# Patient Record
Sex: MALE | Race: WHITE | NOT HISPANIC OR LATINO
[De-identification: names, ages, dates, MRNs, and addresses within clinical notes are randomized per-mention and may not be internally consistent; named-entity substitution may affect disease eponyms.]

---

## 2017-09-11 ENCOUNTER — APPOINTMENT (OUTPATIENT)
Dept: ENDOCRINOLOGY | Facility: CLINIC | Age: 57
End: 2017-09-11

## 2018-09-10 ENCOUNTER — APPOINTMENT (OUTPATIENT)
Dept: OPHTHALMOLOGY | Facility: CLINIC | Age: 58
End: 2018-09-10
Payer: COMMERCIAL

## 2018-09-10 PROCEDURE — 92020 GONIOSCOPY: CPT

## 2018-09-10 PROCEDURE — 92012 INTRM OPH EXAM EST PATIENT: CPT

## 2018-09-10 PROCEDURE — 92083 EXTENDED VISUAL FIELD XM: CPT

## 2019-03-04 ENCOUNTER — APPOINTMENT (OUTPATIENT)
Dept: OPHTHALMOLOGY | Facility: CLINIC | Age: 59
End: 2019-03-04
Payer: COMMERCIAL

## 2019-03-04 DIAGNOSIS — H40.003 PREGLAUCOMA, UNSPECIFIED, BILATERAL: ICD-10-CM

## 2019-03-04 DIAGNOSIS — H40.053 OCULAR HYPERTENSION, BILATERAL: ICD-10-CM

## 2019-03-04 DIAGNOSIS — H40.033 ANATOMICAL NARROW ANGLE, BILATERAL: ICD-10-CM

## 2019-03-04 PROCEDURE — 92020 GONIOSCOPY: CPT

## 2019-03-04 PROCEDURE — 92250 FUNDUS PHOTOGRAPHY W/I&R: CPT

## 2019-03-04 PROCEDURE — 92014 COMPRE OPH EXAM EST PT 1/>: CPT

## 2019-10-08 ENCOUNTER — APPOINTMENT (OUTPATIENT)
Dept: ENDOCRINOLOGY | Facility: CLINIC | Age: 59
End: 2019-10-08
Payer: COMMERCIAL

## 2019-10-08 VITALS
HEART RATE: 66 BPM | SYSTOLIC BLOOD PRESSURE: 123 MMHG | WEIGHT: 146 LBS | DIASTOLIC BLOOD PRESSURE: 71 MMHG | BODY MASS INDEX: 24.32 KG/M2 | HEIGHT: 65 IN

## 2019-10-08 PROCEDURE — 99205 OFFICE O/P NEW HI 60 MIN: CPT

## 2019-10-10 NOTE — ASSESSMENT
[Importance of Diet and Exercise] : importance of diet and exercise to improve glycemic control, achieve weight loss and improve cardiovascular health [Self Monitoring of Blood Glucose] : self monitoring of blood glucose [Smoking Cessation] : smoking cessation [FreeTextEntry1] : 60 y/o M pt with:\par 1. 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 Pt has been and continues to be focused with his DM care. Pt's BS levels are in target, with occasional predictable episodes of hypoglycemias. He does not have any other signs or symptoms of any other endocrine autoimmune disorder. Discussed glucose monitor with pt, which he declined for now. Will order labs today.\par \par Return in 4 months.

## 2019-10-10 NOTE — END OF VISIT
[FreeTextEntry3] : All medical record entries made by the Scribe were at my, Dr. Niraj Greene, direction and personally dictated by me on 10/08/2019. I have reviewed the chart and agree that the record accurately reflects my personal performance of the history, physical exam, assessment and plan. I have also personally directed, reviewed and agreed with the chart.  [>50% of Time Spent on Counseling for ____] : Greater than 50% of the encounter time was spent on counseling for [unfilled] [Time Spent: ___ minutes] : I have spent [unfilled] minutes of face to face time with the patient

## 2019-10-10 NOTE — ADDENDUM
[FreeTextEntry1] : I, Louise Weinstein, acted solely as a scribe for Dr. Niraj Greene on this date. 10/08/2019.

## 2019-10-10 NOTE — PHYSICAL EXAM
[Alert] : alert [Normal Sclera/Conjunctiva] : normal sclera/conjunctiva [Normal Outer Ear/Nose] : the ears and nose were normal in appearance [No Thyroid Nodules] : there were no palpable thyroid nodules [No Respiratory Distress] : no respiratory distress [Clear to Auscultation] : lungs were clear to auscultation bilaterally [Normal Rate] : heart rate was normal  [Normal S1, S2] : normal S1 and S2 [Pedal Pulses Normal] : the pedal pulses are present [No Edema] : there was no peripheral edema [Normal Bowel Sounds] : normal bowel sounds [Spine Straight] : spine straight [Normal Gait] : normal gait [Right Foot Was Examined] : right foot ~C was examined [Left Foot Was Examined] : left foot ~C was examined [2+] : 2+ in the dorsalis pedis [Normal Reflexes] : deep tendon reflexes were 2+ and symmetric [Oriented x3] : oriented to person, place, and time [de-identified] : thyroid glands not palpated, no tenderness  [Vibration Dec.] : normal vibratory sensation at the level of the toes [FreeTextEntry1] : no calluses  [FreeTextEntry5] : no calluses  [de-identified] : no lipodystrophy

## 2019-10-10 NOTE — HISTORY OF PRESENT ILLNESS
[FreeTextEntry1] : 58 y/o male pt, /71, BMI 24.3, with Hx of T1DM (dx at age 24), self-referred, presents today to establish endocrine care with me.\par Other PMHx: R angiofibroma- benign tumor (dx in 2016 as per pt))\par FHx: CA (father) \par Denies FHx of DM \par SHx: active smoker, etOH use 7x a week; works as a musician \par Last funduscopic visit: in 9/2019; no DM retinopathy \par Last podiatrist visit: none recently\par Last PCP visit: none\par Last colonoscopy: in 2018\par Physically active\par \par Pt states he did not have radiation or any CA treatment for his angiofibroma. He notes he last saw the oncologist team x2 weeks ago at [facility] and performed an MRI.\par \par 10/8/19\par Today pt presents for DM f/u, feeling well with no major physical complaints. Pt states his last A1c was 5.9-6%, and he has low BS of "60-70" almost everyday. He does not know of any pattern related to this his low BS. Pt reports when his BS is higher than 180 at night, he would take an extra 1u on insulin. \par Pt states he did not have prostate exam. \par Denies recent hospitalizations and pins and needle sensation in lower extremities.\par \par Current Mediations: Lantus 22u, Humalog 4/6/7-8u ac meals

## 2019-10-21 ENCOUNTER — OTHER (OUTPATIENT)
Age: 59
End: 2019-10-21

## 2019-10-23 LAB
25(OH)D3 SERPL-MCNC: 33.5 NG/ML
ALBUMIN SERPL ELPH-MCNC: 4.5 G/DL
ALP BLD-CCNC: 40 U/L
ALT SERPL-CCNC: 13 U/L
ANION GAP SERPL CALC-SCNC: 11 MMOL/L
AST SERPL-CCNC: 15 U/L
BILIRUB SERPL-MCNC: 0.6 MG/DL
BUN SERPL-MCNC: 13 MG/DL
CALCIUM SERPL-MCNC: 9.7 MG/DL
CHLORIDE SERPL-SCNC: 103 MMOL/L
CHOLEST SERPL-MCNC: 173 MG/DL
CHOLEST/HDLC SERPL: 3 RATIO
CO2 SERPL-SCNC: 27 MMOL/L
CREAT SERPL-MCNC: 0.59 MG/DL
CREAT SPEC-SCNC: 171 MG/DL
ESTIMATED AVERAGE GLUCOSE: 120 MG/DL
GLUCOSE SERPL-MCNC: 80 MG/DL
HBA1C MFR BLD HPLC: 5.8 %
HDLC SERPL-MCNC: 58 MG/DL
LDLC SERPL CALC-MCNC: 101 MG/DL
MICROALBUMIN 24H UR DL<=1MG/L-MCNC: 1.6 MG/DL
MICROALBUMIN/CREAT 24H UR-RTO: 9 MG/G
POTASSIUM SERPL-SCNC: 4.1 MMOL/L
PROT SERPL-MCNC: 7 G/DL
SODIUM SERPL-SCNC: 141 MMOL/L
TRIGL SERPL-MCNC: 72 MG/DL
TSH SERPL-ACNC: 1.96 UIU/ML

## 2020-01-27 ENCOUNTER — APPOINTMENT (OUTPATIENT)
Dept: OPHTHALMOLOGY | Facility: CLINIC | Age: 60
End: 2020-01-27
Payer: COMMERCIAL

## 2020-01-27 ENCOUNTER — NON-APPOINTMENT (OUTPATIENT)
Age: 60
End: 2020-01-27

## 2020-01-27 PROCEDURE — 92014 COMPRE OPH EXAM EST PT 1/>: CPT

## 2020-01-27 PROCEDURE — 92020 GONIOSCOPY: CPT

## 2020-01-27 PROCEDURE — 92250 FUNDUS PHOTOGRAPHY W/I&R: CPT

## 2020-02-04 ENCOUNTER — APPOINTMENT (OUTPATIENT)
Dept: ENDOCRINOLOGY | Facility: CLINIC | Age: 60
End: 2020-02-04
Payer: COMMERCIAL

## 2020-02-04 VITALS
SYSTOLIC BLOOD PRESSURE: 131 MMHG | WEIGHT: 145 LBS | HEIGHT: 65 IN | BODY MASS INDEX: 24.16 KG/M2 | DIASTOLIC BLOOD PRESSURE: 70 MMHG | HEART RATE: 87 BPM

## 2020-02-04 LAB — GLUCOSE BLDC GLUCOMTR-MCNC: 97

## 2020-02-04 PROCEDURE — 99214 OFFICE O/P EST MOD 30 MIN: CPT | Mod: 25

## 2020-02-04 PROCEDURE — 82962 GLUCOSE BLOOD TEST: CPT

## 2020-02-05 NOTE — PHYSICAL EXAM
[Alert] : alert [Normal Sclera/Conjunctiva] : normal sclera/conjunctiva [Normal Outer Ear/Nose] : the ears and nose were normal in appearance [No Respiratory Distress] : no respiratory distress [No Thyroid Nodules] : there were no palpable thyroid nodules [Clear to Auscultation] : lungs were clear to auscultation bilaterally [Normal Rate] : heart rate was normal  [Normal S1, S2] : normal S1 and S2 [No Edema] : there was no peripheral edema [Pedal Pulses Normal] : the pedal pulses are present [Normal Gait] : normal gait [Normal Bowel Sounds] : normal bowel sounds [Spine Straight] : spine straight [Right Foot Was Examined] : right foot ~C was examined [Left Foot Was Examined] : left foot ~C was examined [Oriented x3] : oriented to person, place, and time [Normal Reflexes] : deep tendon reflexes were 2+ and symmetric [2+] : 2+ in the dorsalis pedis [Vibration Dec.] : normal vibratory sensation at the level of the toes [de-identified] : thyroid glands not palpated, no tenderness  [de-identified] : no lipodystrophy  [FreeTextEntry1] : no calluses  [FreeTextEntry5] : no calluses

## 2020-02-05 NOTE — HISTORY OF PRESENT ILLNESS
[FreeTextEntry1] : 10/8/19 A1c 5.8%, TSH 1.96, s. creat 0.59, urine no protein, LDL-c 101, Vit D 25-OH 33.5 \par \par 58 y/o M pt, /70, BMI 24.13, with Hx of T1DM (dx at age 24).\par Other PMHx: R angiofibroma- benign tumor (dx in 2016 as per pt)\par FHx: CA (father) \par Denies FHx of DM \par SHx: active smoker, etOH use 7x a week; works as a musician \par Last funduscopic visit: in 1/2020, no DM retinopathy \par Last podiatrist visit: none recently\par Last PCP visit: none\par Last colonoscopy: in 2018\par Physically active\par \par Pt states he did not have radiation or any CA treatment for his angiofibroma. \par \par 10/8/19\par Today pt presents for DM f/u, feeling well with no major physical complaints. Pt states his last A1c was 5.9-6%, and he has low BS of "60-70" almost everyday. He does not know of any pattern related to this his low BS. Pt reports when his BS is higher than 180 at night, he would take an extra 1u on insulin. \par Pt states he did not have prostate exam. \par Denies recent hospitalizations and pins and needle sensation in lower extremities.\par \par 2/4/20\par Today pt presents for DM f/u with POCT 97, feeling well with c/o "everyday I experience low BS and everyday I experience high BS." He notes he gets an MRI every 9 months for his angiofibroma. Pt states his last colonoscopy was 2 yrs ago.\par Testing glucose 6-10 times daily\par \par Current Mediations: Lantus 22u, Humalog 4/6/7-8u ac meals

## 2020-02-05 NOTE — ASSESSMENT
[Importance of Diet and Exercise] : importance of diet and exercise to improve glycemic control, achieve weight loss and improve cardiovascular health [Smoking Cessation] : smoking cessation [Self Monitoring of Blood Glucose] : self monitoring of blood glucose [Hypoglycemia Management] : hypoglycemia management [FreeTextEntry1] : 60 y/o M pt with:\par 1. Hx of T1DM (dx > 30 yrs ago), well controlled:\par Pt checks BS 10x QD and he experiences occasional episodes of hypoglycemia, however, he is knowledgeable and skillful in preventing and treating them. Pt's BP and LDL-c are in target. \par Recommend pt continue present DM management and refer pt to see urologist.\par \par Return in 6 months.

## 2020-02-05 NOTE — ADDENDUM
[FreeTextEntry1] : I, Louise Weinstein, acted solely as a scribe for Dr. Niraj Greene on this date. 02/04/2020.

## 2020-02-05 NOTE — END OF VISIT
[FreeTextEntry3] : All medical record entries made by the Scribe were at my, Dr. Niraj Greene, direction and personally dictated by me on 02/04/2020. I have reviewed the chart and agree that the record accurately reflects my personal performance of the history, physical exam, assessment and plan. I have also personally directed, reviewed and agreed with the chart.  [>50% of Time Spent on Counseling for ____] : Greater than 50% of the encounter time was spent on counseling for [unfilled] [Time Spent: ___ minutes] : I have spent [unfilled] minutes of face to face time with the patient

## 2020-02-05 NOTE — REVIEW OF SYSTEMS
[As Noted in HPI] : as noted in HPI [Negative] : Psychiatric [FreeTextEntry8] : denies difficulty voiding urine

## 2020-02-24 LAB
ALBUMIN SERPL ELPH-MCNC: 4.8 G/DL
ALP BLD-CCNC: 43 U/L
ALT SERPL-CCNC: 13 U/L
ANION GAP SERPL CALC-SCNC: 13 MMOL/L
AST SERPL-CCNC: 15 U/L
BILIRUB SERPL-MCNC: 0.6 MG/DL
BUN SERPL-MCNC: 19 MG/DL
CALCIUM SERPL-MCNC: 9.9 MG/DL
CHLORIDE SERPL-SCNC: 101 MMOL/L
CHOLEST SERPL-MCNC: 203 MG/DL
CHOLEST/HDLC SERPL: 3.4 RATIO
CO2 SERPL-SCNC: 27 MMOL/L
CREAT SERPL-MCNC: 0.66 MG/DL
CREAT SPEC-SCNC: 205 MG/DL
ESTIMATED AVERAGE GLUCOSE: 126 MG/DL
GLUCOSE SERPL-MCNC: 114 MG/DL
HBA1C MFR BLD HPLC: 6 %
HDLC SERPL-MCNC: 60 MG/DL
LDLC SERPL CALC-MCNC: 131 MG/DL
MICROALBUMIN 24H UR DL<=1MG/L-MCNC: 3.4 MG/DL
MICROALBUMIN/CREAT 24H UR-RTO: 16 MG/G
POTASSIUM SERPL-SCNC: 4.6 MMOL/L
PROT SERPL-MCNC: 7.1 G/DL
SODIUM SERPL-SCNC: 141 MMOL/L
TRIGL SERPL-MCNC: 60 MG/DL
TSH SERPL-ACNC: 0.86 UIU/ML

## 2020-09-08 ENCOUNTER — APPOINTMENT (OUTPATIENT)
Dept: ENDOCRINOLOGY | Facility: CLINIC | Age: 60
End: 2020-09-08

## 2020-10-12 ENCOUNTER — APPOINTMENT (OUTPATIENT)
Dept: OPHTHALMOLOGY | Facility: CLINIC | Age: 60
End: 2020-10-12

## 2020-12-18 ENCOUNTER — RX RENEWAL (OUTPATIENT)
Age: 60
End: 2020-12-18

## 2021-04-27 ENCOUNTER — TRANSCRIPTION ENCOUNTER (OUTPATIENT)
Age: 61
End: 2021-04-27

## 2021-04-27 ENCOUNTER — APPOINTMENT (OUTPATIENT)
Dept: ENDOCRINOLOGY | Facility: CLINIC | Age: 61
End: 2021-04-27
Payer: COMMERCIAL

## 2021-04-27 PROCEDURE — 99214 OFFICE O/P EST MOD 30 MIN: CPT | Mod: 95

## 2021-04-27 NOTE — REASON FOR VISIT
[Home] : at home, [unfilled] , at the time of the visit. [Medical Office: ([facility])___] : at the medical office located in  [Verbal consent obtained from patient] : the patient, [unfilled] [Follow - Up] : a follow-up visit [DM Type 1] : DM Type 1

## 2021-04-29 NOTE — END OF VISIT
[FreeTextEntry3] : All medical record entries made by the Scribe were at my, Dr. Niraj Greene, direction and personally dictated by me on 04/27/2021. I have reviewed the chart and agree that the record accurately reflects my personal performance of the history, physical exam, assessment and plan. I have also personally directed, reviewed and agreed with the chart.  [Time Spent: ___ minutes] : I have spent [unfilled] minutes of time on the encounter.

## 2021-04-29 NOTE — REVIEW OF SYSTEMS
[As Noted in HPI] : as noted in HPI [Negative] : Heme/Lymph [Joint Pain] : no joint pain [FreeTextEntry9] : Denies shoulder pain

## 2021-04-29 NOTE — ASSESSMENT
[FreeTextEntry1] : 61 y/o M pt with: \par \par 1. Hx of T1DM (dx > 30 yrs) \par No known DM related complications \par His A1c is remarkably around 6 (+ or - 0.3) \par Pt is very knowledgeable and skilled in managing his diabetes. When pt experiences episodes of hypoglycemia, there is always an explanation. \par Continue present MDI \par Pt had a remarkable calcium score CT scan a few yrs ago. \par His LDL-c is around 100: 101 in 10/2019, and 131 in 02/2020. \par Recommend Calcium Score CT Scan and discussion on statins. \par \par Return in 6-9 months. \par \par  [Carbohydrate Consistent Diet] : carbohydrate consistent diet [Importance of Diet and Exercise] : importance of diet and exercise to improve glycemic control, achieve weight loss and improve cardiovascular health [Exercise/Effect on Glucose] : exercise/effect on glucose [Hypoglycemia Management] : hypoglycemia management [Self Monitoring of Blood Glucose] : self monitoring of blood glucose

## 2021-04-29 NOTE — ADDENDUM
[FreeTextEntry1] : I, Balta Gutierrez, acted solely as a scribe for Dr. Niraj Greene on this date. 04/27/2021.

## 2021-04-29 NOTE — HISTORY OF PRESENT ILLNESS
[FreeTextEntry1] : 61 y/o M pt, with Hx of T1DM (dx at age 24 in 07/1984).\par Other PMHx: R angiofibroma- benign tumor (dx in 2016 as per pt)\par FHx: CA (father) \par Denies FHx of DM \par SHx: active smoker, etOH use 7x a week; works as a musician \par Last funduscopic visit: in 1/2020, no DM retinopathy \par Last podiatrist visit: none recently\par Last PCP visit: none\par Last colonoscopy: in 2018\par Physically active\par Pt did not take flu shot. Received first dose of COVID vaccine (Pfizer) \par \par Pt states he did not have radiation or any CA treatment for his angiofibroma. \par \par 10/8/19\par Today pt presents for DM f/u, feeling well with no major physical complaints. Pt states his last A1c was 5.9-6%, and he has low BS of "60-70" almost everyday. He does not know of any pattern related to this his low BS. Pt reports when his BS is higher than 180 at night, he would take an extra 1u on insulin. \par Pt states he did not have prostate exam. \par Denies recent hospitalizations and pins and needle sensation in lower extremities.\par \par 2/4/20\par Today pt presents for DM f/u with POCT 97, feeling well with c/o "everyday I experience low BS and everyday I experience high BS." He notes he gets an MRI every 9 months for his angiofibroma. Pt states his last colonoscopy was 2 yrs ago.\par Testing glucose 6-10 times daily\par \par 4/27/21\par Pt did not have any questions prior to the initiation of the telehealth visit. \par Today pt presents for DM f/gary feeling well with no major complaints. Pt experiences intermittent episodes of lower back pain but reports that "lately its been much better" due to change in mindset which reduces pain severity. Last time pt experienced back pain was a couple of months of ago. He denies joint pain, shoulder pain. \par Pt notes he has episodes of low BS every day, but the low BS does not make him feel weak or ill. He is consistent with Lantus dose and Humalog depending on what meals he will eat and his BS readings. Pt recently received a new meter, [employer] Next One. \par Pt notes he has lost 4 lbs. \par He has not seen any physician since 3/2020. \par  \par Current Medications: Lantus 22u, Humalog 4/6/7-8u ac meals \par \par Labs: \par 2/4/20: A1c 6.0%, s.creat 0.66, Ca 9.9, LDL-c 131, cholesterol 203, TSH 0.86, urine no protein \par 10/8/19 A1c 5.8%, TSH 1.96, s. creat 0.59, urine no protein, LDL-c 101, Vit D 25-OH 33.5

## 2021-05-03 ENCOUNTER — NON-APPOINTMENT (OUTPATIENT)
Age: 61
End: 2021-05-03

## 2021-05-04 ENCOUNTER — APPOINTMENT (OUTPATIENT)
Dept: OPHTHALMOLOGY | Facility: CLINIC | Age: 61
End: 2021-05-04
Payer: COMMERCIAL

## 2021-05-04 ENCOUNTER — NON-APPOINTMENT (OUTPATIENT)
Age: 61
End: 2021-05-04

## 2021-05-04 PROCEDURE — 92014 COMPRE OPH EXAM EST PT 1/>: CPT

## 2021-05-04 PROCEDURE — 92133 CPTRZD OPH DX IMG PST SGM ON: CPT

## 2021-05-04 PROCEDURE — 99072 ADDL SUPL MATRL&STAF TM PHE: CPT

## 2021-05-04 PROCEDURE — 92020 GONIOSCOPY: CPT

## 2021-11-01 ENCOUNTER — APPOINTMENT (OUTPATIENT)
Dept: OPHTHALMOLOGY | Facility: CLINIC | Age: 61
End: 2021-11-01
Payer: COMMERCIAL

## 2021-11-01 ENCOUNTER — NON-APPOINTMENT (OUTPATIENT)
Age: 61
End: 2021-11-01

## 2021-11-01 PROCEDURE — 92133 CPTRZD OPH DX IMG PST SGM ON: CPT

## 2021-11-01 PROCEDURE — 99072 ADDL SUPL MATRL&STAF TM PHE: CPT

## 2021-11-01 PROCEDURE — 92014 COMPRE OPH EXAM EST PT 1/>: CPT

## 2022-10-03 ENCOUNTER — NON-APPOINTMENT (OUTPATIENT)
Age: 62
End: 2022-10-03

## 2022-10-04 ENCOUNTER — APPOINTMENT (OUTPATIENT)
Dept: ENDOCRINOLOGY | Facility: CLINIC | Age: 62
End: 2022-10-04

## 2022-10-04 VITALS
HEART RATE: 75 BPM | HEIGHT: 66 IN | SYSTOLIC BLOOD PRESSURE: 129 MMHG | BODY MASS INDEX: 23.3 KG/M2 | WEIGHT: 145 LBS | DIASTOLIC BLOOD PRESSURE: 79 MMHG

## 2022-10-04 LAB — GLUCOSE BLDC GLUCOMTR-MCNC: 134

## 2022-10-04 PROCEDURE — 36415 COLL VENOUS BLD VENIPUNCTURE: CPT

## 2022-10-04 PROCEDURE — 82962 GLUCOSE BLOOD TEST: CPT

## 2022-10-04 PROCEDURE — 99072 ADDL SUPL MATRL&STAF TM PHE: CPT

## 2022-10-04 PROCEDURE — 99215 OFFICE O/P EST HI 40 MIN: CPT | Mod: 25

## 2022-10-04 RX ORDER — BLOOD SUGAR DIAGNOSTIC
STRIP MISCELLANEOUS
Qty: 900 | Refills: 2 | Status: DISCONTINUED | COMMUNITY
Start: 2020-02-04 | End: 2022-10-04

## 2022-10-04 RX ORDER — BLOOD SUGAR DIAGNOSTIC
STRIP MISCELLANEOUS
Qty: 900 | Refills: 2 | Status: DISCONTINUED | COMMUNITY
Start: 2020-12-18 | End: 2022-10-04

## 2022-10-06 NOTE — DATA REVIEWED
[FreeTextEntry1] : Imagining: \par - 06/0/22: CT Cardiac Scoring, Total Calcium Score 181.34 \par \par Labs:   \par - 02/4/20: A1c 6.0%, s.creat 0.66, Ca 9.9, LDL-c 131, cholesterol 203, TSH 0.86, urine no protein \par - 10/8/19 A1c 5.8%, TSH 1.96, s. creat 0.59, urine no protein, LDL-c 101, Vit D 25-OH 33.5

## 2022-10-06 NOTE — ASSESSMENT
[Carbohydrate Consistent Diet] : carbohydrate consistent diet [Importance of Diet and Exercise] : importance of diet and exercise to improve glycemic control, achieve weight loss and improve cardiovascular health [Exercise/Effect on Glucose] : exercise/effect on glucose [Hypoglycemia Management] : hypoglycemia management [Self Monitoring of Blood Glucose] : self monitoring of blood glucose [Action and use of Insulin] : action and use of short and long-acting insulin

## 2022-10-06 NOTE — PHYSICAL EXAM
[Alert] : alert [Well Nourished] : well nourished [No Acute Distress] : no acute distress [Well Developed] : well developed [Normal Sclera/Conjunctiva] : normal sclera/conjunctiva [EOMI] : extra ocular movement intact [No Proptosis] : no proptosis [Normal Oropharynx] : the oropharynx was normal [Thyroid Not Enlarged] : the thyroid was not enlarged [No Thyroid Nodules] : no palpable thyroid nodules [No Respiratory Distress] : no respiratory distress [No Accessory Muscle Use] : no accessory muscle use [Clear to Auscultation] : lungs were clear to auscultation bilaterally [Normal S1, S2] : normal S1 and S2 [Normal Rate] : heart rate was normal [Regular Rhythm] : with a regular rhythm [No Edema] : no peripheral edema [Pedal Pulses Normal] : the pedal pulses are present [Normal Bowel Sounds] : normal bowel sounds [Not Tender] : non-tender [Not Distended] : not distended [Soft] : abdomen soft [Normal Anterior Cervical Nodes] : no anterior cervical lymphadenopathy [Normal Posterior Cervical Nodes] : no posterior cervical lymphadenopathy [No Spinal Tenderness] : no spinal tenderness [Spine Straight] : spine straight [No Stigmata of Cushings Syndrome] : no stigmata of Cushings Syndrome [Normal Gait] : normal gait [Normal Strength/Tone] : muscle strength and tone were normal [No Rash] : no rash [Normal Reflexes] : deep tendon reflexes were 2+ and symmetric [No Tremors] : no tremors [Oriented x3] : oriented to person, place, and time [Acanthosis Nigricans] : no acanthosis nigricans [de-identified] : Sinus clear

## 2022-10-06 NOTE — HISTORY OF PRESENT ILLNESS
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 Smith)\par Last podiatrist visit: none recently\par Last PCP visit: none\par Last colonoscopy: in 2018\par Physically active\par Pt did not take flu shot. Received first dose of COVID vaccine (Pfizer) \par \par Pt states he did not have radiation or any CA treatment for his angiofibroma. \par \par 10/8/19\par Today pt presents for DM f/u, feeling well with no major physical complaints. Pt states his last A1c was 5.9-6%, and he has low BS of "60-70" almost everyday. He does not know of any pattern related to this his low BS. Pt reports when his BS is higher than 180 at night, he would take an extra 1u on insulin. \par Pt states he did not have prostate exam. \par Denies recent hospitalizations and pins and needle sensation in lower extremities.\par \par 2/4/20\par Today pt presents for DM f/u with POCT 97, feeling well with c/o "everyday I experience low BS and everyday I experience high BS." He notes he gets an MRI every 9 months for his angiofibroma. Pt states his last colonoscopy was 2 yrs ago.\par Testing glucose 6-10 times daily\par \par 4/27/21\par Pt did not have any questions prior to the initiation of the telehealth visit. \par Today pt presents for DM f/gary feeling well with no major complaints. Pt experiences intermittent episodes of lower back pain but reports that "lately its been much better" due to change in mindset which reduces pain severity. Last time pt experienced back pain was a couple of months of ago. He denies joint pain, shoulder pain. \par Pt notes he has episodes of low BS every day, but the low BS does not make him feel weak or ill. He is consistent with Lantus dose and Humalog depending on what meals he will eat and his BS readings. Pt recently received a new meter, [employer] Next One. \par Pt notes he has lost 4 lbs. \par He has not seen any physician since 3/2020. \par \par 10/04/2022\par Pt presents today for DM f/u, feeling well with no physical complaints. He does not report any new diagnosis or medication since his last visit. His medicine regiment has not been modified since last visit. Pre and post prandial BS are in target.\par His last ophthalmologist visit was 6 months ago (no major concern, no glaucoma), and he will see ophthalmologist next week  \par Denies numbness in LE, no calluses. \par \par Current Medications: Lantus 22 u, Humalog 5 u ac meals

## 2022-10-06 NOTE — END OF VISIT
[FreeTextEntry3] : All medical record entries made by the Scribe were at my, Dr. Niraj Greene, direction and personally dictated by me on 10/04/2022. I have reviewed the chart and agree that the record accurately reflects my personal performance of the history, physical exam, assessment and plan. I have also personally, directed, reviewed and agreed with the chart  [Time Spent: ___ minutes] : I have spent [unfilled] minutes of time on the encounter.

## 2022-10-06 NOTE — ADDENDUM
[FreeTextEntry1] : I, Shruti Padron, acted solely as a scribe for Dr. Niraj Greene on this date 10/04/2022

## 2022-11-01 ENCOUNTER — APPOINTMENT (OUTPATIENT)
Dept: OPHTHALMOLOGY | Facility: CLINIC | Age: 62
End: 2022-11-01

## 2022-11-01 ENCOUNTER — NON-APPOINTMENT (OUTPATIENT)
Age: 62
End: 2022-11-01

## 2022-11-01 PROCEDURE — 99072 ADDL SUPL MATRL&STAF TM PHE: CPT

## 2022-11-01 PROCEDURE — 92014 COMPRE OPH EXAM EST PT 1/>: CPT

## 2022-11-01 PROCEDURE — 92133 CPTRZD OPH DX IMG PST SGM ON: CPT

## 2022-11-07 LAB
ALBUMIN SERPL ELPH-MCNC: 4.6 G/DL
ALP BLD-CCNC: 43 U/L
ALT SERPL-CCNC: 14 U/L
ANION GAP SERPL CALC-SCNC: 11 MMOL/L
AST SERPL-CCNC: 15 U/L
BILIRUB SERPL-MCNC: 0.9 MG/DL
BUN SERPL-MCNC: 16 MG/DL
CALCIUM SERPL-MCNC: 10.1 MG/DL
CHLORIDE SERPL-SCNC: 102 MMOL/L
CHOLEST SERPL-MCNC: 202 MG/DL
CO2 SERPL-SCNC: 29 MMOL/L
CREAT SERPL-MCNC: 0.66 MG/DL
CREAT SPEC-SCNC: 199 MG/DL
EGFR: 106 ML/MIN/1.73M2
ESTIMATED AVERAGE GLUCOSE: 126 MG/DL
GLUCOSE SERPL-MCNC: 104 MG/DL
HBA1C MFR BLD HPLC: 6 %
HDLC SERPL-MCNC: 70 MG/DL
LDLC SERPL CALC-MCNC: 123 MG/DL
MICROALBUMIN 24H UR DL<=1MG/L-MCNC: 1.6 MG/DL
MICROALBUMIN/CREAT 24H UR-RTO: 8 MG/G
NONHDLC SERPL-MCNC: 132 MG/DL
POTASSIUM SERPL-SCNC: 4.7 MMOL/L
PROT SERPL-MCNC: 7 G/DL
SODIUM SERPL-SCNC: 143 MMOL/L
TRIGL SERPL-MCNC: 49 MG/DL

## 2023-04-04 ENCOUNTER — APPOINTMENT (OUTPATIENT)
Dept: ENDOCRINOLOGY | Facility: CLINIC | Age: 63
End: 2023-04-04
Payer: COMMERCIAL

## 2023-04-04 VITALS
WEIGHT: 149 LBS | SYSTOLIC BLOOD PRESSURE: 139 MMHG | HEART RATE: 80 BPM | DIASTOLIC BLOOD PRESSURE: 81 MMHG | BODY MASS INDEX: 24.05 KG/M2

## 2023-04-04 DIAGNOSIS — H93.8X9 OTHER SPECIFIED DISORDERS OF EAR, UNSPECIFIED EAR: ICD-10-CM

## 2023-04-04 PROCEDURE — 99072 ADDL SUPL MATRL&STAF TM PHE: CPT

## 2023-04-04 PROCEDURE — 82962 GLUCOSE BLOOD TEST: CPT

## 2023-04-04 PROCEDURE — 99215 OFFICE O/P EST HI 40 MIN: CPT | Mod: 25

## 2023-04-04 PROCEDURE — 83036 HEMOGLOBIN GLYCOSYLATED A1C: CPT | Mod: QW

## 2023-04-07 NOTE — END OF VISIT
[FreeTextEntry3] : All medical record entries made by the Scribe were at my, Dr. Niraj Greene, direction and personally dictated by me on 04/04/2023. I have reviewed the chart and agree that the record accurately reflects my personal performance of the history, physical exam, assessment and plan. I have also personally, directed, reviewed and agreed with the chart  [Time Spent: ___ minutes] : I have spent [unfilled] minutes of time on the encounter.

## 2023-04-07 NOTE — ADDENDUM
[FreeTextEntry1] : I, Shruti Padron, acted solely as a scribe for Dr. Niraj Greene on this date 04/04/2023

## 2023-04-07 NOTE — REVIEW OF SYSTEMS
[Negative] : Heme/Lymph [As Noted in HPI] : as noted in HPI [de-identified] : hypnagogic sensation

## 2023-04-07 NOTE — ASSESSMENT
[Carbohydrate Consistent Diet] : carbohydrate consistent diet [Importance of Diet and Exercise] : importance of diet and exercise to improve glycemic control, achieve weight loss and improve cardiovascular health [Exercise/Effect on Glucose] : exercise/effect on glucose [Hypoglycemia Management] : hypoglycemia management [Action and use of Insulin] : action and use of short and long-acting insulin [Self Monitoring of Blood Glucose] : self monitoring of blood glucose [FreeTextEntry1] : \par

## 2023-04-07 NOTE — HISTORY OF PRESENT ILLNESS
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 Smith)\par Last podiatrist visit: none recently\par Last PCP visit: none\par Last colonoscopy: in 2018\par Physically active\par Pt did not take flu shot. Received first dose of COVID vaccine (Pfizer) \par \par Pt states he did not have radiation or any CA treatment for his angiofibroma. \par \par 10/8/19\par Today pt presents for DM f/u, feeling well with no major physical complaints. Pt states his last A1c was 5.9-6%, and he has low BS of "60-70" almost everyday. He does not know of any pattern related to this his low BS. Pt reports when his BS is higher than 180 at night, he would take an extra 1u on insulin. \par Pt states he did not have prostate exam. \par Denies recent hospitalizations and pins and needle sensation in lower extremities.\par \par 2/4/20\par Today pt presents for DM f/u with POCT 97, feeling well with c/o "everyday I experience low BS and everyday I experience high BS." He notes he gets an MRI every 9 months for his angiofibroma. Pt states his last colonoscopy was 2 yrs ago.\par Testing glucose 6-10 times daily\par \par 4/27/21\par Pt did not have any questions prior to the initiation of the telehealth visit. \par Today pt presents for DM f/gary feeling well with no major complaints. Pt experiences intermittent episodes of lower back pain but reports that "lately its been much better" due to change in mindset which reduces pain severity. Last time pt experienced back pain was a couple of months of ago. He denies joint pain, shoulder pain. \par Pt notes he has episodes of low BS every day, but the low BS does not make him feel weak or ill. He is consistent with Lantus dose and Humalog depending on what meals he will eat and his BS readings. Pt recently received a new meter, Contour Next One. \par Pt notes he has lost 4 lbs. \par He has not seen any physician since 3/2020. \par \par 10/04/2022\par Pt presents today for DM f/u, feeling well with no physical complaints. He does not report any new diagnosis or medication since his last visit. His medicine regiment has not been modified since last visit. Pre and post prandial BS are in target.\par His last ophthalmologist visit was 6 months ago (no major concern, no glaucoma), and he will see ophthalmologist next week  \par Denies numbness in LE, no calluses.  \par \par 04/04/2023\par CC: " I feel the same. " After last COVID vaccine and flu vaccine, ~ 11/22, pt endorses feeling sudden pressure going up with ears that "pop," and feels a better passage of air. Endorses hypnagogic sensation. These symptoms are ongoing. Denies decreased smelling and taste. \par Does not have PCP. Pt says his DM is okay, he is taking the same DM regiment. He has recently seen ophthalmologist, was told his pressure is slightly above normal but likely won't develop glaucoma. Not consistently taking cholesterol medications. Pt states his feet are doing well. Has Hypoglycemic episodes everyday.  He uses fingersticks for BS monitoring. \par \par  Current Medications: Lantus 22 u, Humalog 5 u ac meals, atorvastatin sporadically

## 2023-04-07 NOTE — PHYSICAL EXAM
[Alert] : alert [Well Nourished] : well nourished [No Acute Distress] : no acute distress [Well Developed] : well developed [Normal Sclera/Conjunctiva] : normal sclera/conjunctiva [EOMI] : extra ocular movement intact [No Proptosis] : no proptosis [Normal Oropharynx] : the oropharynx was normal [Thyroid Not Enlarged] : the thyroid was not enlarged [No Thyroid Nodules] : no palpable thyroid nodules [No Respiratory Distress] : no respiratory distress [No Accessory Muscle Use] : no accessory muscle use [Clear to Auscultation] : lungs were clear to auscultation bilaterally [Normal S1, S2] : normal S1 and S2 [Normal Rate] : heart rate was normal [Regular Rhythm] : with a regular rhythm [No Edema] : no peripheral edema [Pedal Pulses Normal] : the pedal pulses are present [Normal Bowel Sounds] : normal bowel sounds [Not Tender] : non-tender [Not Distended] : not distended [Soft] : abdomen soft [Normal Anterior Cervical Nodes] : no anterior cervical lymphadenopathy [No Spinal Tenderness] : no spinal tenderness [Spine Straight] : spine straight [No Stigmata of Cushings Syndrome] : no stigmata of Cushings Syndrome [Normal Gait] : normal gait [Normal Strength/Tone] : muscle strength and tone were normal [No Rash] : no rash [Normal Reflexes] : deep tendon reflexes were 2+ and symmetric [No Tremors] : no tremors [Oriented x3] : oriented to person, place, and time [Acanthosis Nigricans] : no acanthosis nigricans [de-identified] : Sinus clear

## 2023-04-16 ENCOUNTER — NON-APPOINTMENT (OUTPATIENT)
Age: 63
End: 2023-04-16

## 2023-04-17 ENCOUNTER — APPOINTMENT (OUTPATIENT)
Dept: OTOLARYNGOLOGY | Facility: CLINIC | Age: 63
End: 2023-04-17
Payer: COMMERCIAL

## 2023-04-17 ENCOUNTER — APPOINTMENT (OUTPATIENT)
Dept: OTOLARYNGOLOGY | Facility: CLINIC | Age: 63
End: 2023-04-17

## 2023-04-17 VITALS
WEIGHT: 148 LBS | OXYGEN SATURATION: 98 % | SYSTOLIC BLOOD PRESSURE: 157 MMHG | HEART RATE: 106 BPM | BODY MASS INDEX: 24.66 KG/M2 | DIASTOLIC BLOOD PRESSURE: 85 MMHG | TEMPERATURE: 98 F | HEIGHT: 65 IN

## 2023-04-17 DIAGNOSIS — J30.1 ALLERGIC RHINITIS DUE TO POLLEN: ICD-10-CM

## 2023-04-17 DIAGNOSIS — H93.8X3 OTHER SPECIFIED DISORDERS OF EAR, BILATERAL: ICD-10-CM

## 2023-04-17 DIAGNOSIS — H61.23 IMPACTED CERUMEN, BILATERAL: ICD-10-CM

## 2023-04-17 DIAGNOSIS — H69.80 OTHER SPECIFIED DISORDERS OF EUSTACHIAN TUBE, UNSPECIFIED EAR: ICD-10-CM

## 2023-04-17 PROCEDURE — 69210 REMOVE IMPACTED EAR WAX UNI: CPT

## 2023-04-17 PROCEDURE — 31231 NASAL ENDOSCOPY DX: CPT

## 2023-04-17 PROCEDURE — 92557 COMPREHENSIVE HEARING TEST: CPT

## 2023-04-17 PROCEDURE — 99072 ADDL SUPL MATRL&STAF TM PHE: CPT

## 2023-04-17 PROCEDURE — 92550 TYMPANOMETRY & REFLEX THRESH: CPT | Mod: 52

## 2023-04-17 PROCEDURE — 99203 OFFICE O/P NEW LOW 30 MIN: CPT | Mod: 25

## 2023-04-17 RX ORDER — FLUTICASONE PROPIONATE 50 UG/1
50 SPRAY, METERED NASAL DAILY
Qty: 1 | Refills: 3 | Status: ACTIVE | COMMUNITY
Start: 2023-04-17 | End: 1900-01-01

## 2023-04-18 NOTE — ASSESSMENT
[FreeTextEntry1] : - cerumen removed\par - b notch at 4 KHz c/w noise exposure- rec ear protection, repeat  1 yr\par - a.r/etd - flonase, avoidance; advised to dc flonase if glc increases\par urged to quit smoking\par rtc 6 mo and as needed\par

## 2023-04-18 NOTE — REASON FOR VISIT
[Initial Evaluation] : an initial evaluation for [FreeTextEntry2] : b aural fullness, popping, nasal obstruction

## 2023-04-18 NOTE — HISTORY OF PRESENT ILLNESS
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 He sometime has subsequent ringing seconds long, usually high-pitched; it is happening more and more frequently. never in the past. had 4th covid vaccine in October, 2022. Also when wakes up he still feels ears pop. Denies hl. Water  bothers his ears. -fh - childhood ear problems. After ears pop, he breathes more clearly through his nose. He is a smoker. Urged to quit.

## 2023-04-18 NOTE — PROCEDURE
[Anterior rhinoscopy insufficient to account for symptoms] : anterior rhinoscopy insufficient to account for symptoms [Posterior Lesion] : posterior lesion [Topical Lidocaine] : topical lidocaine [Oxymetazoline HCl] : oxymetazoline HCl [Flexible Endoscope] : examined with the flexible endoscope [Serial Number: ___] : Serial Number: [unfilled] [Allergic] : allergic signs [Normal] : the paranasal sinuses had no abnormalities [Cerumen Impaction] : Cerumen Impaction [Same] : same as the Pre Op Dx. [] : Removal of Cerumen [FreeTextEntry6] : b copious cerumen impaction removed atraumatically with suction

## 2023-04-18 NOTE — PHYSICAL EXAM
[Nasal Endoscopy Performed] : nasal endoscopy was performed, see procedure section for findings [Midline] : trachea located in midline position [Normal] : no rashes [de-identified] : b copious cerumen impaction removed atraumatically with suction [de-identified] : ar [de-identified] : ar

## 2023-05-16 ENCOUNTER — NON-APPOINTMENT (OUTPATIENT)
Age: 63
End: 2023-05-16

## 2023-05-16 ENCOUNTER — APPOINTMENT (OUTPATIENT)
Dept: HEART AND VASCULAR | Facility: CLINIC | Age: 63
End: 2023-05-16
Payer: COMMERCIAL

## 2023-05-16 VITALS — DIASTOLIC BLOOD PRESSURE: 80 MMHG | SYSTOLIC BLOOD PRESSURE: 122 MMHG

## 2023-05-16 VITALS
OXYGEN SATURATION: 98 % | BODY MASS INDEX: 23.99 KG/M2 | SYSTOLIC BLOOD PRESSURE: 134 MMHG | TEMPERATURE: 98.6 F | DIASTOLIC BLOOD PRESSURE: 88 MMHG | HEART RATE: 61 BPM | WEIGHT: 144 LBS | HEIGHT: 65 IN

## 2023-05-16 DIAGNOSIS — R00.2 PALPITATIONS: ICD-10-CM

## 2023-05-16 PROCEDURE — 99204 OFFICE O/P NEW MOD 45 MIN: CPT | Mod: 25

## 2023-05-16 PROCEDURE — 93306 TTE W/DOPPLER COMPLETE: CPT

## 2023-05-16 PROCEDURE — 93000 ELECTROCARDIOGRAM COMPLETE: CPT

## 2023-05-18 ENCOUNTER — APPOINTMENT (OUTPATIENT)
Dept: HEART AND VASCULAR | Facility: CLINIC | Age: 63
End: 2023-05-18
Payer: COMMERCIAL

## 2023-05-18 VITALS — HEART RATE: 93 BPM | DIASTOLIC BLOOD PRESSURE: 70 MMHG | SYSTOLIC BLOOD PRESSURE: 140 MMHG

## 2023-05-18 PROCEDURE — 99214 OFFICE O/P EST MOD 30 MIN: CPT | Mod: 25

## 2023-05-18 PROCEDURE — 93015 CV STRESS TEST SUPVJ I&R: CPT

## 2023-06-04 LAB
ANION GAP SERPL CALC-SCNC: 11 MMOL/L
BUN SERPL-MCNC: 15 MG/DL
CALCIUM SERPL-MCNC: 10.1 MG/DL
CHLORIDE SERPL-SCNC: 103 MMOL/L
CHOLEST SERPL-MCNC: 220 MG/DL
CO2 SERPL-SCNC: 26 MMOL/L
CREAT SERPL-MCNC: 0.64 MG/DL
CREAT SPEC-SCNC: 46 MG/DL
EGFR: 107 ML/MIN/1.73M2
ESTIMATED AVERAGE GLUCOSE: 123 MG/DL
GLUCOSE BLDC GLUCOMTR-MCNC: 155
GLUCOSE SERPL-MCNC: 149 MG/DL
HBA1C MFR BLD HPLC: 5.7
HBA1C MFR BLD HPLC: 5.9 %
HDLC SERPL-MCNC: 71 MG/DL
LDLC SERPL CALC-MCNC: 139 MG/DL
MICROALBUMIN 24H UR DL<=1MG/L-MCNC: <1.2 MG/DL
MICROALBUMIN/CREAT 24H UR-RTO: NORMAL MG/G
NONHDLC SERPL-MCNC: 149 MG/DL
POTASSIUM SERPL-SCNC: 4.7 MMOL/L
SODIUM SERPL-SCNC: 140 MMOL/L
TRIGL SERPL-MCNC: 48 MG/DL
TSH SERPL-ACNC: 1.29 UIU/ML

## 2023-09-18 ENCOUNTER — APPOINTMENT (OUTPATIENT)
Dept: HEART AND VASCULAR | Facility: CLINIC | Age: 63
End: 2023-09-18
Payer: COMMERCIAL

## 2023-09-18 VITALS
WEIGHT: 148 LBS | SYSTOLIC BLOOD PRESSURE: 129 MMHG | HEART RATE: 56 BPM | DIASTOLIC BLOOD PRESSURE: 80 MMHG | TEMPERATURE: 97 F | OXYGEN SATURATION: 97 % | BODY MASS INDEX: 24.66 KG/M2 | HEIGHT: 65 IN

## 2023-09-18 VITALS
OXYGEN SATURATION: 97 % | SYSTOLIC BLOOD PRESSURE: 129 MMHG | HEIGHT: 65 IN | BODY MASS INDEX: 24.66 KG/M2 | HEART RATE: 56 BPM | DIASTOLIC BLOOD PRESSURE: 80 MMHG | WEIGHT: 148 LBS

## 2023-09-18 VITALS — SYSTOLIC BLOOD PRESSURE: 114 MMHG | DIASTOLIC BLOOD PRESSURE: 73 MMHG

## 2023-09-18 DIAGNOSIS — R07.89 OTHER CHEST PAIN: ICD-10-CM

## 2023-09-18 PROCEDURE — 99214 OFFICE O/P EST MOD 30 MIN: CPT

## 2023-09-18 PROCEDURE — 93000 ELECTROCARDIOGRAM COMPLETE: CPT

## 2023-09-19 LAB
CHOLEST SERPL-MCNC: 187 MG/DL
CRP SERPL-MCNC: <3 MG/L
HCYS SERPL-MCNC: 9.2 UMOL/L
HDLC SERPL-MCNC: 71 MG/DL
LDLC SERPL CALC-MCNC: 107 MG/DL
NONHDLC SERPL-MCNC: 116 MG/DL
TRIGL SERPL-MCNC: 47 MG/DL

## 2023-09-20 LAB — APO LP(A) SERPL-MCNC: 34.3 NMOL/L

## 2023-09-22 ENCOUNTER — APPOINTMENT (OUTPATIENT)
Dept: HEART AND VASCULAR | Facility: CLINIC | Age: 63
End: 2023-09-22
Payer: COMMERCIAL

## 2023-09-22 DIAGNOSIS — I25.10 ATHEROSCLEROTIC HEART DISEASE OF NATIVE CORONARY ARTERY W/OUT ANGINA PECTORIS: ICD-10-CM

## 2023-09-22 DIAGNOSIS — F17.200 NICOTINE DEPENDENCE, UNSPECIFIED, UNCOMPLICATED: ICD-10-CM

## 2023-09-22 DIAGNOSIS — E78.5 HYPERLIPIDEMIA, UNSPECIFIED: ICD-10-CM

## 2023-09-22 DIAGNOSIS — R03.0 ELEVATED BLOOD-PRESSURE READING, W/OUT DIAGNOSIS OF HYPERTENSION: ICD-10-CM

## 2023-09-22 PROCEDURE — 99443: CPT

## 2023-09-24 PROBLEM — I25.10 NONOBSTRUCTIVE ATHEROSCLEROSIS OF CORONARY ARTERY: Status: ACTIVE | Noted: 2023-09-24

## 2023-09-24 PROBLEM — F17.200 TOBACCO DEPENDENCE: Status: ACTIVE | Noted: 2023-09-18

## 2023-09-24 PROBLEM — E78.5 HYPERLIPIDEMIA: Status: ACTIVE | Noted: 2022-10-06

## 2023-09-24 PROBLEM — R03.0 PREHYPERTENSION: Status: ACTIVE | Noted: 2023-05-16

## 2023-11-03 ENCOUNTER — APPOINTMENT (OUTPATIENT)
Dept: OPHTHALMOLOGY | Facility: CLINIC | Age: 63
End: 2023-11-03

## 2023-12-08 ENCOUNTER — RX RENEWAL (OUTPATIENT)
Age: 63
End: 2023-12-08

## 2024-02-05 ENCOUNTER — NON-APPOINTMENT (OUTPATIENT)
Age: 64
End: 2024-02-05

## 2024-02-06 ENCOUNTER — APPOINTMENT (OUTPATIENT)
Dept: OPHTHALMOLOGY | Facility: CLINIC | Age: 64
End: 2024-02-06
Payer: COMMERCIAL

## 2024-02-06 ENCOUNTER — NON-APPOINTMENT (OUTPATIENT)
Age: 64
End: 2024-02-06

## 2024-02-06 PROCEDURE — 92014 COMPRE OPH EXAM EST PT 1/>: CPT

## 2024-02-06 PROCEDURE — 92020 GONIOSCOPY: CPT

## 2024-02-06 PROCEDURE — 92133 CPTRZD OPH DX IMG PST SGM ON: CPT

## 2024-02-20 ENCOUNTER — TRANSCRIPTION ENCOUNTER (OUTPATIENT)
Age: 64
End: 2024-02-20

## 2024-02-22 ENCOUNTER — TRANSCRIPTION ENCOUNTER (OUTPATIENT)
Age: 64
End: 2024-02-22

## 2024-02-23 ENCOUNTER — TRANSCRIPTION ENCOUNTER (OUTPATIENT)
Age: 64
End: 2024-02-23

## 2024-02-23 LAB
ANION GAP SERPL CALC-SCNC: 11 MMOL/L
BUN SERPL-MCNC: 16 MG/DL
CALCIUM SERPL-MCNC: 9.7 MG/DL
CHLORIDE SERPL-SCNC: 105 MMOL/L
CHOLEST SERPL-MCNC: 209 MG/DL
CO2 SERPL-SCNC: 28 MMOL/L
CREAT SERPL-MCNC: 0.65 MG/DL
CREAT SPEC-SCNC: 251 MG/DL
EGFR: 106 ML/MIN/1.73M2
GLUCOSE SERPL-MCNC: 64 MG/DL
HDLC SERPL-MCNC: 80 MG/DL
LDLC SERPL CALC-MCNC: 122 MG/DL
MICROALBUMIN 24H UR DL<=1MG/L-MCNC: 1.5 MG/DL
MICROALBUMIN/CREAT 24H UR-RTO: 6 MG/G
NONHDLC SERPL-MCNC: 129 MG/DL
POTASSIUM SERPL-SCNC: 4.5 MMOL/L
SODIUM SERPL-SCNC: 144 MMOL/L
TRIGL SERPL-MCNC: 36 MG/DL

## 2024-02-25 LAB
ESTIMATED AVERAGE GLUCOSE: 123 MG/DL
HBA1C MFR BLD HPLC: 5.9 %

## 2024-02-26 ENCOUNTER — APPOINTMENT (OUTPATIENT)
Dept: ENDOCRINOLOGY | Facility: CLINIC | Age: 64
End: 2024-02-26
Payer: COMMERCIAL

## 2024-02-26 ENCOUNTER — TRANSCRIPTION ENCOUNTER (OUTPATIENT)
Age: 64
End: 2024-02-26

## 2024-02-26 VITALS
BODY MASS INDEX: 24.96 KG/M2 | SYSTOLIC BLOOD PRESSURE: 130 MMHG | DIASTOLIC BLOOD PRESSURE: 71 MMHG | HEART RATE: 71 BPM | WEIGHT: 150 LBS

## 2024-02-26 DIAGNOSIS — E10.9 TYPE 1 DIABETES MELLITUS W/OUT COMPLICATIONS: ICD-10-CM

## 2024-02-26 LAB — GLUCOSE BLDC GLUCOMTR-MCNC: 106

## 2024-02-26 PROCEDURE — 99214 OFFICE O/P EST MOD 30 MIN: CPT | Mod: 25

## 2024-02-26 PROCEDURE — 82962 GLUCOSE BLOOD TEST: CPT

## 2024-02-26 RX ORDER — LANCETS
EACH MISCELLANEOUS
Qty: 300 | Refills: 1 | Status: ACTIVE | COMMUNITY
Start: 2021-04-28 | End: 1900-01-01

## 2024-02-26 RX ORDER — INSULIN LISPRO 100 [IU]/ML
100 INJECTION, SOLUTION INTRAVENOUS; SUBCUTANEOUS
Qty: 3 | Refills: 2 | Status: ACTIVE | COMMUNITY
Start: 2019-10-21 | End: 1900-01-01

## 2024-02-26 RX ORDER — INSULIN GLARGINE 100 [IU]/ML
100 INJECTION, SOLUTION SUBCUTANEOUS
Qty: 3 | Refills: 2 | Status: ACTIVE | COMMUNITY
Start: 2020-02-04 | End: 1900-01-01

## 2024-02-26 RX ORDER — BLOOD SUGAR DIAGNOSTIC
STRIP MISCELLANEOUS
Qty: 900 | Refills: 3 | Status: ACTIVE | COMMUNITY
Start: 2021-04-28 | End: 1900-01-01

## 2024-02-26 RX ORDER — PEN NEEDLE, DIABETIC 29 G X1/2"
32G X 4 MM NEEDLE, DISPOSABLE MISCELLANEOUS
Qty: 600 | Refills: 3 | Status: ACTIVE | COMMUNITY
Start: 2020-02-04 | End: 1900-01-01

## 2024-02-26 RX ORDER — ISOPROPYL ALCOHOL 0.7 ML/ML
SWAB TOPICAL
Qty: 1 | Refills: 3 | Status: ACTIVE | COMMUNITY
Start: 2024-02-26 | End: 1900-01-01

## 2024-02-26 NOTE — ASSESSMENT
[Carbohydrate Consistent Diet] : carbohydrate consistent diet [Importance of Diet and Exercise] : importance of diet and exercise to improve glycemic control, achieve weight loss and improve cardiovascular health [Hypoglycemia Management] : hypoglycemia management [Exercise/Effect on Glucose] : exercise/effect on glucose [Action and use of Insulin] : action and use of short and long-acting insulin [Self Monitoring of Blood Glucose] : self monitoring of blood glucose [FreeTextEntry1] : \par

## 2024-02-26 NOTE — END OF VISIT
[FreeTextEntry3] :  All medical record entries made by the Scribe were at my, Dr. Niraj Greene, direction and personally dictated by me on 02/26/2024. I have reviewed the chart and agree that the record accurately reflects my personal performance of the history, physical exam, assessment and plan. I have also personally directed, reviewed and agreed with the chart. [Time Spent: ___ minutes] : I have spent [unfilled] minutes of time on the encounter.

## 2024-02-26 NOTE — HISTORY OF PRESENT ILLNESS
[FreeTextEntry1] : 64 y/o M pt, with Hx of T1DM (dx at age 24 in 07/1984). 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 Smith) Last podiatrist visit: none recently Last PCP visit: none Last colonoscopy: in 2018 Physically active Pt did not take flu shot. Received first dose of COVID vaccine ([employer])   Pt states he did not have radiation or any CA treatment for his angiofibroma.   10/8/19 Today pt presents for DM f/u, feeling well with no major physical complaints. Pt states his last A1c was 5.9-6%, and he has low BS of "60-70" almost everyday. He does not know of any pattern related to this his low BS. Pt reports when his BS is higher than 180 at night, he would take an extra 1u on insulin.  Pt states he did not have prostate exam.  Denies recent hospitalizations and pins and needle sensation in lower extremities.  2/4/20 Today pt presents for DM f/u with POCT 97, feeling well with c/o "everyday I experience low BS and everyday I experience high BS." He notes he gets an MRI every 9 months for his angiofibroma. Pt states his last colonoscopy was 2 yrs ago. Testing glucose 6-10 times daily  4/27/21 Pt did not have any questions prior to the initiation of the telehealth visit.  Today pt presents for DM f/[place] feeling well with no major complaints. Pt experiences intermittent episodes of lower back pain but reports that "lately its been much better" due to change in mindset which reduces pain severity. Last time pt experienced back pain was a couple of months of ago. He denies joint pain, shoulder pain.  Pt notes he has episodes of low BS every day, but the low BS does not make him feel weak or ill. He is consistent with Lantus dose and Humalog depending on what meals he will eat and his BS readings. Pt recently received a new meter, [employer].  Pt notes he has lost 4 lbs.  He has not seen any physician since 3/2020.   10/04/2022 Pt presents today for DM f/u, feeling well with no physical complaints. He does not report any new diagnosis or medication since his last visit. His medicine regiment has not been modified since last visit. Pre and post prandial BS are in target. His last ophthalmologist visit was 6 months ago (no major concern, no glaucoma), and he will see ophthalmologist next week   Denies numbness in LE, no calluses.    04/04/2023 CC: " I feel the same." After last COVID vaccine and flu vaccine, ~ 11/22, pt endorses feeling sudden pressure going up with ears that "pop," and feels a better passage of air. Endorses hypnagogic sensation. These symptoms are ongoing. Denies decreased smelling and taste.  Does not have PCP. Pt says his DM is okay, he is taking the same DM regiment. He has recently seen ophthalmologist, was told his pressure is slightly above normal but likely won't develop glaucoma. Not consistently taking cholesterol medications. Pt states his feet are doing well. Has Hypoglycemic episodes everyday. He uses fingersticks for BS monitoring.   02/26/2024  Pt has POCT 106, /71 and BMI 24.96. No significant weight change CC: "I feel great." Pt followed up with his ophthalmologist (Dr. Luis Hamilton[?]) and cardiologist (Keenan Downing MD) recently. His retinal exam was normal, but his BP and cholesterol was slightly elevated.  Pt reports hypoglycemias often (daily), but emphasizes that he can easily recognize and manage it.   [Medications verified as per pt on 02/26/2024] Current Medications: Lantus 22 u, Humalog 4-7 u ac meals

## 2024-02-26 NOTE — PHYSICAL EXAM
[No Acute Distress] : no acute distress [Normal Sclera/Conjunctiva] : normal sclera/conjunctiva [Thyroid Not Enlarged] : the thyroid was not enlarged [No Thyroid Nodules] : no palpable thyroid nodules [Clear to Auscultation] : lungs were clear to auscultation bilaterally [Normal Rate] : heart rate was normal [No Edema] : no peripheral edema [Pedal Pulses Normal] : the pedal pulses are present [Soft] : abdomen soft [No Spinal Tenderness] : no spinal tenderness [Spine Straight] : spine straight [No Stigmata of Cushings Syndrome] : no stigmata of Cushings Syndrome [Normal Gait] : normal gait [Normal Strength/Tone] : muscle strength and tone were normal [No Rash] : no rash [Normal Reflexes] : deep tendon reflexes were 2+ and symmetric [Oriented x3] : oriented to person, place, and time [No Tremors] : no tremors [Normal Outer Ear/Nose] : the ears and nose were normal in appearance [Acanthosis Nigricans] : no acanthosis nigricans [de-identified] : Sinus clear

## 2025-02-11 ENCOUNTER — APPOINTMENT (OUTPATIENT)
Dept: OPHTHALMOLOGY | Facility: CLINIC | Age: 65
End: 2025-02-11
Payer: COMMERCIAL

## 2025-02-11 ENCOUNTER — NON-APPOINTMENT (OUTPATIENT)
Age: 65
End: 2025-02-11

## 2025-02-11 PROCEDURE — 92133 CPTRZD OPH DX IMG PST SGM ON: CPT

## 2025-02-11 PROCEDURE — 92020 GONIOSCOPY: CPT

## 2025-02-11 PROCEDURE — 92014 COMPRE OPH EXAM EST PT 1/>: CPT

## 2025-02-26 ENCOUNTER — RX RENEWAL (OUTPATIENT)
Age: 65
End: 2025-02-26

## 2025-03-25 ENCOUNTER — APPOINTMENT (OUTPATIENT)
Dept: ENDOCRINOLOGY | Facility: CLINIC | Age: 65
End: 2025-03-25
Payer: COMMERCIAL

## 2025-03-25 VITALS
HEIGHT: 65 IN | SYSTOLIC BLOOD PRESSURE: 135 MMHG | HEART RATE: 80 BPM | BODY MASS INDEX: 24.24 KG/M2 | DIASTOLIC BLOOD PRESSURE: 77 MMHG | WEIGHT: 145.5 LBS

## 2025-03-25 DIAGNOSIS — E10.9 TYPE 1 DIABETES MELLITUS W/OUT COMPLICATIONS: ICD-10-CM

## 2025-03-25 LAB
GLUCOSE BLDC GLUCOMTR-MCNC: 156
HBA1C MFR BLD HPLC: 6

## 2025-03-25 PROCEDURE — 82962 GLUCOSE BLOOD TEST: CPT

## 2025-03-25 PROCEDURE — 36415 COLL VENOUS BLD VENIPUNCTURE: CPT

## 2025-03-25 PROCEDURE — 83036 HEMOGLOBIN GLYCOSYLATED A1C: CPT | Mod: QW

## 2025-03-25 PROCEDURE — 99214 OFFICE O/P EST MOD 30 MIN: CPT

## 2025-03-26 LAB
ALBUMIN SERPL ELPH-MCNC: 4.5 G/DL
ALP BLD-CCNC: 51 U/L
ALT SERPL-CCNC: 12 U/L
ANION GAP SERPL CALC-SCNC: 13 MMOL/L
AST SERPL-CCNC: 14 U/L
BILIRUB SERPL-MCNC: 0.6 MG/DL
BUN SERPL-MCNC: 17 MG/DL
CALCIUM SERPL-MCNC: 9.8 MG/DL
CHLORIDE SERPL-SCNC: 102 MMOL/L
CHOLEST SERPL-MCNC: 185 MG/DL
CO2 SERPL-SCNC: 27 MMOL/L
CREAT SERPL-MCNC: 0.68 MG/DL
CREAT SPEC-SCNC: 150 MG/DL
EGFRCR SERPLBLD CKD-EPI 2021: 104 ML/MIN/1.73M2
ESTIMATED AVERAGE GLUCOSE: 131 MG/DL
GLUCOSE SERPL-MCNC: 133 MG/DL
HBA1C MFR BLD HPLC: 6.2 %
HCT VFR BLD CALC: 44 %
HDLC SERPL-MCNC: 59 MG/DL
HGB BLD-MCNC: 14.9 G/DL
LDLC SERPL-MCNC: 113 MG/DL
MCHC RBC-ENTMCNC: 31.1 PG
MCHC RBC-ENTMCNC: 33.9 G/DL
MCV RBC AUTO: 91.9 FL
MICROALBUMIN 24H UR DL<=1MG/L-MCNC: <1.2 MG/DL
MICROALBUMIN/CREAT 24H UR-RTO: NORMAL MG/G
NONHDLC SERPL-MCNC: 126 MG/DL
PLATELET # BLD AUTO: 315 K/UL
POTASSIUM SERPL-SCNC: 4.9 MMOL/L
PROT SERPL-MCNC: 6.9 G/DL
RBC # BLD: 4.79 M/UL
RBC # FLD: 13.2 %
SODIUM SERPL-SCNC: 142 MMOL/L
TRIGL SERPL-MCNC: 70 MG/DL
TSH SERPL-ACNC: 2.82 UIU/ML
WBC # FLD AUTO: 4.58 K/UL

## 2025-09-01 ENCOUNTER — NON-APPOINTMENT (OUTPATIENT)
Age: 65
End: 2025-09-01

## 2025-09-02 ENCOUNTER — APPOINTMENT (OUTPATIENT)
Dept: ENDOCRINOLOGY | Facility: CLINIC | Age: 65
End: 2025-09-02
Payer: COMMERCIAL

## 2025-09-02 VITALS
WEIGHT: 150 LBS | BODY MASS INDEX: 24.96 KG/M2 | DIASTOLIC BLOOD PRESSURE: 83 MMHG | HEART RATE: 69 BPM | SYSTOLIC BLOOD PRESSURE: 151 MMHG

## 2025-09-02 DIAGNOSIS — E10.9 TYPE 1 DIABETES MELLITUS W/OUT COMPLICATIONS: ICD-10-CM

## 2025-09-02 PROCEDURE — 82962 GLUCOSE BLOOD TEST: CPT

## 2025-09-02 PROCEDURE — 99215 OFFICE O/P EST HI 40 MIN: CPT

## 2025-09-02 PROCEDURE — 36415 COLL VENOUS BLD VENIPUNCTURE: CPT

## 2025-09-02 PROCEDURE — 83036 HEMOGLOBIN GLYCOSYLATED A1C: CPT | Mod: QW

## 2025-09-03 LAB
ALBUMIN SERPL ELPH-MCNC: 4.4 G/DL
ALBUMIN, RANDOM URINE: <1.2 MG/DL
ALP BLD-CCNC: 43 U/L
ALT SERPL-CCNC: 19 U/L
ANION GAP SERPL CALC-SCNC: 13 MMOL/L
AST SERPL-CCNC: 20 U/L
BILIRUB SERPL-MCNC: 0.5 MG/DL
BUN SERPL-MCNC: 18 MG/DL
CALCIUM SERPL-MCNC: 9.8 MG/DL
CHLORIDE SERPL-SCNC: 102 MMOL/L
CHOLEST SERPL-MCNC: 185 MG/DL
CO2 SERPL-SCNC: 26 MMOL/L
CREAT SERPL-MCNC: 0.65 MG/DL
CREAT SPEC-SCNC: 76 MG/DL
EGFRCR SERPLBLD CKD-EPI 2021: 105 ML/MIN/1.73M2
GLUCOSE BLDC GLUCOMTR-MCNC: 7.8
GLUCOSE SERPL-MCNC: 73 MG/DL
HBA1C MFR BLD HPLC: 6
HCT VFR BLD CALC: 42.7 %
HDLC SERPL-MCNC: 67 MG/DL
HGB BLD-MCNC: 14.1 G/DL
LDLC SERPL-MCNC: 110 MG/DL
MCHC RBC-ENTMCNC: 31 PG
MCHC RBC-ENTMCNC: 33 G/DL
MCV RBC AUTO: 93.8 FL
MICROALBUMIN/CREAT 24H UR-RTO: NORMAL MG/G
NONHDLC SERPL-MCNC: 117 MG/DL
PLATELET # BLD AUTO: 307 K/UL
POTASSIUM SERPL-SCNC: 4.5 MMOL/L
PROT SERPL-MCNC: 7.1 G/DL
RBC # BLD: 4.55 M/UL
RBC # FLD: 13.4 %
SODIUM SERPL-SCNC: 141 MMOL/L
TRIGL SERPL-MCNC: 39 MG/DL
TSH SERPL-ACNC: 1.99 UIU/ML
WBC # FLD AUTO: 6.5 K/UL

## 2025-09-05 ENCOUNTER — NON-APPOINTMENT (OUTPATIENT)
Age: 65
End: 2025-09-05

## 2025-09-05 ENCOUNTER — APPOINTMENT (OUTPATIENT)
Dept: OPHTHALMOLOGY | Facility: CLINIC | Age: 65
End: 2025-09-05
Payer: COMMERCIAL

## 2025-09-05 PROCEDURE — 92250 FUNDUS PHOTOGRAPHY W/I&R: CPT

## 2025-09-05 PROCEDURE — 92014 COMPRE OPH EXAM EST PT 1/>: CPT

## 2025-09-12 ENCOUNTER — APPOINTMENT (OUTPATIENT)
Dept: OPHTHALMOLOGY | Facility: CLINIC | Age: 65
End: 2025-09-12
Payer: COMMERCIAL

## 2025-09-12 ENCOUNTER — NON-APPOINTMENT (OUTPATIENT)
Age: 65
End: 2025-09-12

## 2025-09-12 PROCEDURE — 92250 FUNDUS PHOTOGRAPHY W/I&R: CPT

## 2025-09-12 PROCEDURE — 92014 COMPRE OPH EXAM EST PT 1/>: CPT
